# Patient Record
Sex: FEMALE | Race: WHITE | Employment: OTHER | URBAN - METROPOLITAN AREA
[De-identification: names, ages, dates, MRNs, and addresses within clinical notes are randomized per-mention and may not be internally consistent; named-entity substitution may affect disease eponyms.]

---

## 2017-02-26 ENCOUNTER — HOSPITAL ENCOUNTER (EMERGENCY)
Age: 57
Discharge: HOME | End: 2017-02-26
Attending: EMERGENCY MEDICINE
Payer: COMMERCIAL

## 2017-02-26 VITALS
HEIGHT: 67 IN | SYSTOLIC BLOOD PRESSURE: 122 MMHG | BODY MASS INDEX: 23.7 KG/M2 | HEART RATE: 58 BPM | TEMPERATURE: 97.5 F | WEIGHT: 151 LBS | RESPIRATION RATE: 16 BRPM | DIASTOLIC BLOOD PRESSURE: 70 MMHG | OXYGEN SATURATION: 100 %

## 2017-02-26 DIAGNOSIS — R10.31 ABDOMINAL PAIN, RIGHT LOWER QUADRANT: Primary | ICD-10-CM

## 2017-02-26 LAB
ALBUMIN SERPL BCP-MCNC: 3.5 G/DL (ref 3.5–5)
ALBUMIN/GLOB SERPL: 1 {RATIO} (ref 1.1–2.2)
ALP SERPL-CCNC: 64 U/L (ref 45–117)
ALT SERPL-CCNC: 16 U/L (ref 12–78)
ANION GAP BLD CALC-SCNC: 9 MMOL/L (ref 5–15)
APPEARANCE UR: CLEAR
AST SERPL W P-5'-P-CCNC: 8 U/L (ref 15–37)
BACTERIA URNS QL MICRO: NEGATIVE /HPF
BASOPHILS # BLD AUTO: 0 K/UL (ref 0–0.1)
BASOPHILS # BLD: 0 % (ref 0–1)
BILIRUB SERPL-MCNC: 0.4 MG/DL (ref 0.2–1)
BILIRUB UR QL: NEGATIVE
BUN SERPL-MCNC: 10 MG/DL (ref 6–20)
BUN/CREAT SERPL: 12 (ref 12–20)
CALCIUM SERPL-MCNC: 8.4 MG/DL (ref 8.5–10.1)
CHLORIDE SERPL-SCNC: 109 MMOL/L (ref 97–108)
CO2 SERPL-SCNC: 22 MMOL/L (ref 21–32)
COLOR UR: ABNORMAL
CREAT SERPL-MCNC: 0.84 MG/DL (ref 0.55–1.02)
EOSINOPHIL # BLD: 0.1 K/UL (ref 0–0.4)
EOSINOPHIL NFR BLD: 1 % (ref 0–7)
EPITH CASTS URNS QL MICRO: ABNORMAL /LPF
ERYTHROCYTE [DISTWIDTH] IN BLOOD BY AUTOMATED COUNT: 13.6 % (ref 11.5–14.5)
GLOBULIN SER CALC-MCNC: 3.4 G/DL (ref 2–4)
GLUCOSE SERPL-MCNC: 73 MG/DL (ref 65–100)
GLUCOSE UR STRIP.AUTO-MCNC: NEGATIVE MG/DL
HCT VFR BLD AUTO: 37.7 % (ref 35–47)
HGB BLD-MCNC: 12.6 G/DL (ref 11.5–16)
HGB UR QL STRIP: NEGATIVE
KETONES UR QL STRIP.AUTO: ABNORMAL MG/DL
LEUKOCYTE ESTERASE UR QL STRIP.AUTO: NEGATIVE
LYMPHOCYTES # BLD AUTO: 29 % (ref 12–49)
LYMPHOCYTES # BLD: 1.8 K/UL (ref 0.8–3.5)
MCH RBC QN AUTO: 29.9 PG (ref 26–34)
MCHC RBC AUTO-ENTMCNC: 33.4 G/DL (ref 30–36.5)
MCV RBC AUTO: 89.3 FL (ref 80–99)
MONOCYTES # BLD: 0.5 K/UL (ref 0–1)
MONOCYTES NFR BLD AUTO: 7 % (ref 5–13)
MUCOUS THREADS URNS QL MICRO: ABNORMAL /LPF
NEUTS SEG # BLD: 4 K/UL (ref 1.8–8)
NEUTS SEG NFR BLD AUTO: 63 % (ref 32–75)
NITRITE UR QL STRIP.AUTO: NEGATIVE
PH UR STRIP: 5.5 [PH] (ref 5–8)
PLATELET # BLD AUTO: 214 K/UL (ref 150–400)
POTASSIUM SERPL-SCNC: 4.1 MMOL/L (ref 3.5–5.1)
PROT SERPL-MCNC: 6.9 G/DL (ref 6.4–8.2)
PROT UR STRIP-MCNC: NEGATIVE MG/DL
RBC # BLD AUTO: 4.22 M/UL (ref 3.8–5.2)
RBC #/AREA URNS HPF: ABNORMAL /HPF (ref 0–5)
SODIUM SERPL-SCNC: 140 MMOL/L (ref 136–145)
SP GR UR REFRACTOMETRY: 1.02 (ref 1–1.03)
UROBILINOGEN UR QL STRIP.AUTO: 0.2 EU/DL (ref 0.2–1)
WBC # BLD AUTO: 6.4 K/UL (ref 3.6–11)
WBC URNS QL MICRO: ABNORMAL /HPF (ref 0–4)

## 2017-02-26 PROCEDURE — 99283 EMERGENCY DEPT VISIT LOW MDM: CPT

## 2017-02-26 PROCEDURE — 80053 COMPREHEN METABOLIC PANEL: CPT | Performed by: EMERGENCY MEDICINE

## 2017-02-26 PROCEDURE — 81001 URINALYSIS AUTO W/SCOPE: CPT | Performed by: NURSE PRACTITIONER

## 2017-02-26 PROCEDURE — 36415 COLL VENOUS BLD VENIPUNCTURE: CPT | Performed by: EMERGENCY MEDICINE

## 2017-02-26 PROCEDURE — 85025 COMPLETE CBC W/AUTO DIFF WBC: CPT | Performed by: EMERGENCY MEDICINE

## 2017-02-26 RX ORDER — MEGESTROL ACETATE 40 MG/1
TABLET ORAL
Refills: 1 | COMMUNITY
Start: 2017-02-10

## 2017-02-26 RX ORDER — MEDROXYPROGESTERONE ACETATE 10 MG/1
TABLET ORAL
Refills: 0 | COMMUNITY
Start: 2017-02-17

## 2017-02-26 RX ORDER — NITROFURANTOIN 25; 75 MG/1; MG/1
CAPSULE ORAL
Refills: 0 | COMMUNITY
Start: 2016-12-12 | End: 2017-02-26

## 2017-02-26 RX ORDER — HYDROCODONE BITARTRATE AND ACETAMINOPHEN 5; 300 MG/1; MG/1
TABLET ORAL
Refills: 0 | COMMUNITY
Start: 2017-01-23 | End: 2017-03-27 | Stop reason: ALTCHOICE

## 2017-02-26 RX ORDER — DICYCLOMINE HYDROCHLORIDE 10 MG/1
CAPSULE ORAL
Refills: 2 | COMMUNITY
Start: 2017-02-22 | End: 2017-03-27 | Stop reason: ALTCHOICE

## 2017-02-26 RX ORDER — SODIUM, POTASSIUM,MAG SULFATES 17.5-3.13G
SOLUTION, RECONSTITUTED, ORAL ORAL
Refills: 0 | COMMUNITY
Start: 2017-02-20 | End: 2017-02-26

## 2017-02-26 NOTE — ED PROVIDER NOTES
HPI Comments: 50 yo F with no previous medical hx presents ambulatory to the ED with persistent RLQ abdominal pain since December for which she has been evaluated multiple times. Pt states she has been evaluated in the ED, at [facility], with GYN and GI. She states she has had 2 US, CT scan, uterine bx as well as colonoscopy with bx of polyp. Pt states she was prescribed Diclycomine which she states has not improved her pain. Was advised to return for evaluation in 6 weeks with Dr. Gary Sandoval. The pt states her pain has not been improved by the Dicyclomine and has not been taking it. She has been taking hydrocodone prn. Pt denies nausea, vomiting, diarrhea, constipation, dysuria or hematuria. History reviewed. No pertinent past medical history. Social History    Marital status:              Spouse name:                       Years of education:                 Number of children:               Occupational History    None on file    Social History Main Topics    Smoking status: Never Smoker                                                                   Smokeless status: Not on file                       Alcohol use: Not on file     Drug use: Not on file     Sexual activity: Not on file          Other Topics            Concern    None on file    Social History Narrative    None on file          Patient is a 49 y.o. female presenting with abdominal pain. Abdominal Pain    Pertinent negatives include no fever, no diarrhea, no nausea, no vomiting, no constipation, no hematuria, no headaches, no chest pain and no back pain. History reviewed. No pertinent past medical history. Past Surgical History:   Procedure Laterality Date    HX APPENDECTOMY      HX PARTIAL THYROIDECTOMY           History reviewed. No pertinent family history.     Social History     Social History    Marital status:      Spouse name: N/A    Number of children: N/A    Years of education: N/A     Occupational History    Not on file. Social History Main Topics    Smoking status: Never Smoker    Smokeless tobacco: Not on file    Alcohol use Not on file    Drug use: Not on file    Sexual activity: Not on file     Other Topics Concern    Not on file     Social History Narrative         ALLERGIES: Review of patient's allergies indicates no known allergies. Review of Systems   Constitutional: Negative for activity change, appetite change, chills and fever. HENT: Negative for ear discharge and facial swelling. Eyes: Negative for discharge and redness. Respiratory: Negative for chest tightness, shortness of breath and wheezing. Cardiovascular: Negative for chest pain, palpitations and leg swelling. Gastrointestinal: Positive for abdominal pain. Negative for blood in stool, constipation, diarrhea, nausea, rectal pain and vomiting. Genitourinary: Negative for difficulty urinating, hematuria and urgency. Musculoskeletal: Negative for back pain, joint swelling, neck pain and neck stiffness. Skin: Negative for rash. Neurological: Negative for seizures, speech difficulty, weakness and headaches. Psychiatric/Behavioral: Negative for confusion. Vitals:    02/26/17 1046   BP: 117/69   Pulse: 67   Resp: 16   Temp: 98.3 °F (36.8 °C)   SpO2: 100%   Weight: 68.5 kg (151 lb)   Height: 5' 7\" (1.702 m)            Physical Exam   Constitutional: She is oriented to person, place, and time. She appears well-developed and well-nourished. No distress. HENT:   Head: Normocephalic and atraumatic. Eyes: Conjunctivae and EOM are normal. Pupils are equal, round, and reactive to light. Neck: Normal range of motion. Neck supple. Cardiovascular: Normal rate, regular rhythm, normal heart sounds and intact distal pulses. Pulmonary/Chest: Effort normal and breath sounds normal. No accessory muscle usage. No tachypnea. No respiratory distress. She has no decreased breath sounds. She has no wheezes.    B breath sounds CTA. No expiratory wheezing,crackles or rhonchi. No chest wall tenderness, tachycardia or tachypnea. No evidence of hypoxia. Abdominal: Soft. Bowel sounds are normal. She exhibits no distension and no mass. There is tenderness in the right lower quadrant. There is no rebound and no guarding. Abdomen soft, with tenderness to palpation of RLQ. (The appendix is absent) Active BS throughout. No flank or CVA tenderness. No rigidity, masses or guarding. Genitourinary:   Genitourinary Comments: No hematuria, dysuria. Musculoskeletal: Normal range of motion. Neurological: She is alert and oriented to person, place, and time. She has normal strength. She displays no atrophy. No cranial nerve deficit or sensory deficit. She exhibits normal muscle tone. Coordination and gait normal. GCS eye subscore is 4. GCS verbal subscore is 5. GCS motor subscore is 6. Skin: Skin is warm and dry. Psychiatric: She has a normal mood and affect. Her behavior is normal. Judgment and thought content normal.   Nursing note and vitals reviewed. MDM  Number of Diagnoses or Management Options  Abdominal pain, right lower quadrant:   Diagnosis management comments: 50 yo F with persistent RLQ pain. Denies nausea, vomiting, diarrhea, constipation. Denies hematuria or dysuria. Pt has been evaluated by GYN and GI. Has had CT, US x 2, bx, and colonoscopy with removal of polyp. Prescribed Dicyclomine which she states has not helped. Abdomen soft, with tenderness is RLQ. BS active throughout. Labs, UA ordered. Discussed hx, presentation and findings with Dr. Clayton Hill who agrees with plan of care and plan for FU with Dr. Nakia Eaton this week,return to the ED for worsening sx. Pt tolerating po fluids.   Pt has pain medications at home and will not be discharged with medications today       Amount and/or Complexity of Data Reviewed  Clinical lab tests: ordered and reviewed  Discuss the patient with other providers: yes ( Luis Hurtado    Patient Progress  Patient progress: stable    ED Course       Procedures

## 2017-02-26 NOTE — ED TRIAGE NOTES
Pt reports right lower quad abd pain for the past 2 months. Pt has seen her OBGYN and GI doctor ans was told they did not know what was wrong. Pt states the pain is worse and she had to take pain medication just to get to the ER.

## 2017-02-26 NOTE — DISCHARGE INSTRUCTIONS
Abdominal Pain: Care Instructions  Your Care Instructions    Abdominal pain has many possible causes. Some aren't serious and get better on their own in a few days. Others need more testing and treatment. If your pain continues or gets worse, you need to be rechecked and may need more tests to find out what is wrong. You may need surgery to correct the problem. Don't ignore new symptoms, such as fever, nausea and vomiting, urination problems, pain that gets worse, and dizziness. These may be signs of a more serious problem. Your doctor may have recommended a follow-up visit in the next 8 to 12 hours. If you are not getting better, you may need more tests or treatment. The doctor has checked you carefully, but problems can develop later. If you notice any problems or new symptoms, get medical treatment right away. Follow-up care is a key part of your treatment and safety. Be sure to make and go to all appointments, and call your doctor if you are having problems. It's also a good idea to know your test results and keep a list of the medicines you take. How can you care for yourself at home? · Rest until you feel better. · To prevent dehydration, drink plenty of fluids, enough so that your urine is light yellow or clear like water. Choose water and other caffeine-free clear liquids until you feel better. If you have kidney, heart, or liver disease and have to limit fluids, talk with your doctor before you increase the amount of fluids you drink. · If your stomach is upset, eat mild foods, such as rice, dry toast or crackers, bananas, and applesauce. Try eating several small meals instead of two or three large ones. · Wait until 48 hours after all symptoms have gone away before you have spicy foods, alcohol, and drinks that contain caffeine. · Do not eat foods that are high in fat. · Avoid anti-inflammatory medicines such as aspirin, ibuprofen (Advil, Motrin), and naproxen (Aleve).  These can cause stomach upset. Talk to your doctor if you take daily aspirin for another health problem. When should you call for help? Call 911 anytime you think you may need emergency care. For example, call if:  · You passed out (lost consciousness). · You pass maroon or very bloody stools. · You vomit blood or what looks like coffee grounds. · You have new, severe belly pain. Call your doctor now or seek immediate medical care if:  · Your pain gets worse, especially if it becomes focused in one area of your belly. · You have a new or higher fever. · Your stools are black and look like tar, or they have streaks of blood. · You have unexpected vaginal bleeding. · You have symptoms of a urinary tract infection. These may include:  ¨ Pain when you urinate. ¨ Urinating more often than usual.  ¨ Blood in your urine. · You are dizzy or lightheaded, or you feel like you may faint. Watch closely for changes in your health, and be sure to contact your doctor if:  · You are not getting better after 1 day (24 hours). Where can you learn more? Go to http://sallyFoodcloudluma.info/. Enter M239 in the search box to learn more about \"Abdominal Pain: Care Instructions. \"  Current as of: May 27, 2016  Content Version: 11.1  © 9253-1065 [employer]. Care instructions adapted under license by [employer] (which disclaims liability or warranty for this information). If you have questions about a medical condition or this instruction, always ask your healthcare professional. [place] [address] any warranty or liability for your use of this information. We hope that we have addressed all of your medical concerns. The examination and treatment you received in the Emergency Department were for an emergent problem and were not intended as complete care. It is important that you follow up with your healthcare provider(s) for ongoing care.  If your symptoms worsen or do not improve as expected, and you are unable to reach your usual health care provider(s), you should return to the Emergency Department. Today's healthcare is undergoing tremendous change, and patient satisfaction surveys are one of the many tools to assess the quality of medical care. You may receive a survey from the [employer] regarding your experience in the Emergency Department. I hope that your experience has been completely positive, particularly the medical care that I provided. As such, please participate in the survey; anything less than excellent does not meet my expectations or intentions. [facility] and [facility] participate in nationally recognized quality of care measures. If your blood pressure is greater than 120/80, as reported below, we urge that you seek medical care to address the potential of high blood pressure, commonly known as hypertension. Hypertension can be hereditary or can be caused by certain medical conditions, pain, stress, or \"white coat syndrome. \"       Please make an appointment with your health care provider(s) for follow up of your Emergency Department visit. VITALS:   Patient Vitals for the past 8 hrs:   Temp Pulse Resp BP SpO2   02/26/17 1046 98.3 °F (36.8 °C) 67 16 117/69 100 %          Thank you for allowing us to provide you with medical care today. We realize that you have many choices for your emergency care needs. Please choose us in the future for any continued health care needs. [facility]   Office: [phone]            Recent Results (from the past 24 hour(s))   CBC WITH AUTOMATED DIFF    Collection Time: 02/26/17 12:24 PM   Result Value Ref Range    WBC 6.4 3.6 - 11.0 K/uL    RBC 4.22 3.80 - 5.20 M/uL    HGB 12.6 11.5 - 16.0 g/dL    HCT 37.7 35.0 - 47.0 %    MCV 89.3 80.0 - 99.0 FL    MCH 29.9 26.0 - 34.0 PG    MCHC 33.4 30.0 - 36.5 g/dL    RDW 13.6 11.5 - 14.5 %    PLATELET 181 125 - 509 K/uL    NEUTROPHILS 63 32 - 75 %    LYMPHOCYTES 29 12 - 49 %    MONOCYTES 7 5 - 13 %    EOSINOPHILS 1 0 - 7 %    BASOPHILS 0 0 - 1 %    ABS. NEUTROPHILS 4.0 1.8 - 8.0 K/UL    ABS. LYMPHOCYTES 1.8 0.8 - 3.5 K/UL    ABS. MONOCYTES 0.5 0.0 - 1.0 K/UL    ABS. EOSINOPHILS 0.1 0.0 - 0.4 K/UL    ABS. BASOPHILS 0.0 0.0 - 0.1 K/UL   METABOLIC PANEL, COMPREHENSIVE    Collection Time: 02/26/17 12:24 PM   Result Value Ref Range    Sodium 140 136 - 145 mmol/L    Potassium 4.1 3.5 - 5.1 mmol/L    Chloride 109 (H) 97 - 108 mmol/L    CO2 22 21 - 32 mmol/L    Anion gap 9 5 - 15 mmol/L    Glucose 73 65 - 100 mg/dL    BUN 10 6 - 20 MG/DL    Creatinine 0.84 0.55 - 1.02 MG/DL    BUN/Creatinine ratio 12 12 - 20      GFR est AA >60 >60 ml/min/1.73m2    GFR est non-AA >60 >60 ml/min/1.73m2    Calcium 8.4 (L) 8.5 - 10.1 MG/DL    Bilirubin, total 0.4 0.2 - 1.0 MG/DL    ALT (SGPT) 16 12 - 78 U/L    AST (SGOT) 8 (L) 15 - 37 U/L    Alk. phosphatase 64 45 - 117 U/L    Protein, total 6.9 6.4 - 8.2 g/dL    Albumin 3.5 3.5 - 5.0 g/dL    Globulin 3.4 2.0 - 4.0 g/dL    A-G Ratio 1.0 (L) 1.1 - 2.2     URINALYSIS W/MICROSCOPIC    Collection Time: 02/26/17 12:24 PM   Result Value Ref Range    Color YELLOW/STRAW      Appearance CLEAR CLEAR      Specific gravity 1.024 1.003 - 1.030      pH (UA) 5.5 5.0 - 8.0      Protein NEGATIVE  NEG mg/dL    Glucose NEGATIVE  NEG mg/dL    Ketone TRACE (A) NEG mg/dL    Bilirubin NEGATIVE  NEG      Blood NEGATIVE  NEG      Urobilinogen 0.2 0.2 - 1.0 EU/dL    Nitrites NEGATIVE  NEG      Leukocyte Esterase NEGATIVE  NEG      WBC 0-4 0 - 4 /hpf    RBC 0-5 0 - 5 /hpf    Epithelial cells FEW FEW /lpf    Bacteria NEGATIVE  NEG /hpf    Mucus TRACE (A) NEG /lpf       No results found.

## 2017-02-26 NOTE — ED NOTES
Pt ambulatory out of ED with discharge instructions and prescriptions in hand given by Luzmaria Campbell NP; pt verbalized understanding of discharge paperwork and time allotted for questions. VSS. Pt alert and oriented.

## 2017-03-27 ENCOUNTER — OFFICE VISIT (OUTPATIENT)
Dept: BEHAVIORAL/MENTAL HEALTH CLINIC | Age: 57
End: 2017-03-27

## 2017-03-27 VITALS
BODY MASS INDEX: 23.02 KG/M2 | SYSTOLIC BLOOD PRESSURE: 109 MMHG | WEIGHT: 147 LBS | DIASTOLIC BLOOD PRESSURE: 59 MMHG | HEART RATE: 59 BPM

## 2017-03-27 DIAGNOSIS — F41.9 SEVERE ANXIETY: ICD-10-CM

## 2017-03-27 DIAGNOSIS — F33.2 SEVERE EPISODE OF RECURRENT MAJOR DEPRESSIVE DISORDER, WITHOUT PSYCHOTIC FEATURES (HCC): Primary | ICD-10-CM

## 2017-03-27 DIAGNOSIS — G47.00 INSOMNIA, UNSPECIFIED TYPE: ICD-10-CM

## 2017-03-27 RX ORDER — ESCITALOPRAM OXALATE 10 MG/1
TABLET ORAL
Refills: 1 | COMMUNITY
Start: 2017-03-13 | End: 2017-03-27 | Stop reason: DRUGHIGH

## 2017-03-27 RX ORDER — LORAZEPAM 0.5 MG/1
0.5 TABLET ORAL
Qty: 30 TAB | Refills: 0 | Status: SHIPPED | OUTPATIENT
Start: 2017-03-27 | End: 2017-04-26

## 2017-03-27 RX ORDER — ESCITALOPRAM OXALATE 10 MG/1
15 TABLET ORAL DAILY
Qty: 45 TAB | Refills: 0 | Status: SHIPPED | OUTPATIENT
Start: 2017-03-27 | End: 2017-04-24 | Stop reason: SDUPTHER

## 2017-03-27 NOTE — MR AVS SNAPSHOT
Visit Information Date & Time Provider Department Dept. Phone Encounter #  
 3/27/2017 10:00 AM Jagruti Rios MD [facility] [phone] [id number] Follow-up Instructions Return in about 1 month (around 4/27/2017). Your Appointments 4/24/2017  9:30 AM  
ESTABLISHED PATIENT with Jagruti Rios MD  
[facility]: New Patient f/u  
 [address] [facility] [address]  
[phone] [address] [facility] [address] Upcoming Health Maintenance Date Due Hepatitis C Screening 1960 DTaP/Tdap/Td series (1 - Tdap) 12/17/1983 PAP AKA CERVICAL CYTOLOGY 12/17/1983 BREAST CANCER SCRN MAMMOGRAM 12/17/2012 FOBT Q 1 YEAR AGE 50-75 12/17/2012 INFLUENZA AGE 9 TO ADULT 8/1/2016 Allergies as of 3/27/2017  Review Complete On: 3/27/2017 By: Jagruti Rios MD  
 No Known Allergies Current Immunizations  Never Reviewed No immunizations on file. Not reviewed this visit You Were Diagnosed With   
  
 Codes Comments Severe episode of recurrent major depressive disorder, without psychotic features ([facility] [address]    -  Primary ICD-10-CM: F33.2 ICD-9-CM: 296.33 Severe anxiety     ICD-10-CM: F41.9 ICD-9-CM: 300.00 Insomnia, unspecified type     ICD-10-CM: G47.00 ICD-9-CM: 780.52 Vitals BP Pulse Weight(growth percentile) LMP BMI OB Status 109/59 (!) 59 147 lb (66.7 kg) 03/20/2017 23.02 kg/m2 Having regular periods Smoking Status Never Smoker Vitals History BMI and BSA Data Body Mass Index Body Surface Area 23.02 kg/m 2 1.78 m 2 Preferred Pharmacy Pharmacy Name Phone [facility] [address] [phone] Your Updated Medication List  
  
   
This list is accurate as of: 3/27/17  5:26 PM.  Always use your most recent med list.  
  
  
 escitalopram oxalate 10 mg tablet Commonly known as:  Georgiann Bares Take 1.5 Tabs by mouth daily for 30 days. Indications: ANXIETY WITH DEPRESSION  
  
 levothyroxine 50 mcg tablet Commonly known as:  SYNTHROID Take 50 mcg by mouth Daily (before breakfast). LORazepam 0.5 mg tablet Commonly known as:  ATIVAN Take 1 Tab by mouth daily as needed for Anxiety for up to 30 days. Indications: ANXIETY  
  
 medroxyPROGESTERone 10 mg tablet Commonly known as:  PROVERA TAKE 1 TAB BY MOUTH DAILY FOR THE FIRST 10 DAYS OF THE MONTH. START TAKING IN APRIL. megestrol 40 mg tablet Commonly known as:  MEGACE  
TAKE 1 TABLET BY MOUTH DAILY  
  
 OMEGA 3 PO Take  by mouth daily. VITAMIN D3 PO Take  by mouth daily. Prescriptions Printed Refills  
 escitalopram oxalate (LEXAPRO) 10 mg tablet 0 Sig: Take 1.5 Tabs by mouth daily for 30 days. Indications: ANXIETY WITH DEPRESSION Class: Print Route: Oral  
 LORazepam (ATIVAN) 0.5 mg tablet 0 Sig: Take 1 Tab by mouth daily as needed for Anxiety for up to 30 days. Indications: ANXIETY Class: Print Route: Oral  
  
Follow-up Instructions Return in about 1 month (around 4/27/2017). Introducing [address]! Remberto Christinazina introduces [employer] patient portal. Now you can access parts of your medical record, email your doctor's office, and request medication refills online. 1. In your internet browser, go to [email or web address] 2. Click on the First Time User? Click Here link in the Sign In box. You will see the New Member Sign Up page. 3. Enter your [employer] Access Code exactly as it appears below. You will not need to use this code after youve completed the sign-up process. If you do not sign up before the expiration date, you must request a new code. · [employer] Access Code: [email or web address] Expires: 6/25/2017 10:21 AM 
 
 4. Enter the last four digits of your Social Security Number (xxxx) and Date of Birth (mm/dd/yyyy) as indicated and click Submit. You will be taken to the next sign-up page. 5. Create a [employer] ID. This will be your [employer] login ID and cannot be changed, so think of one that is secure and easy to remember. 6. Create a [employer] password. You can change your password at any time. 7. Enter your Password Reset Question and Answer. This can be used at a later time if you forget your password. 8. Enter your e-mail address. You will receive e-mail notification when new information is available in [address] 9. Click Sign Up. You can now view and download portions of your medical record. 10. Click the Download Summary menu link to download a portable copy of your medical information. If you have questions, please visit the Frequently Asked Questions section of the [employer] website. Remember, [employer] is NOT to be used for urgent needs. For medical emergencies, dial 911. Now available from your iPhone and Android! Please provide this summary of care documentation to your next provider. Your primary care clinician is listed as NONE. If you have any questions after today's visit, please call [phone].
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
## 2017-04-24 ENCOUNTER — OFFICE VISIT (OUTPATIENT)
Dept: BEHAVIORAL/MENTAL HEALTH CLINIC | Age: 57
End: 2017-04-24

## 2017-04-24 VITALS
BODY MASS INDEX: 23.23 KG/M2 | DIASTOLIC BLOOD PRESSURE: 70 MMHG | HEIGHT: 67 IN | WEIGHT: 148 LBS | SYSTOLIC BLOOD PRESSURE: 151 MMHG | HEART RATE: 73 BPM

## 2017-04-24 DIAGNOSIS — G47.00 INSOMNIA, UNSPECIFIED TYPE: ICD-10-CM

## 2017-04-24 DIAGNOSIS — F41.0 SEVERE ANXIETY WITH PANIC: ICD-10-CM

## 2017-04-24 DIAGNOSIS — F33.2 SEVERE EPISODE OF RECURRENT MAJOR DEPRESSIVE DISORDER, WITHOUT PSYCHOTIC FEATURES (HCC): Primary | ICD-10-CM

## 2017-04-24 RX ORDER — ESCITALOPRAM OXALATE 10 MG/1
15 TABLET ORAL DAILY
Qty: 45 TAB | Refills: 2 | Status: SHIPPED | OUTPATIENT
Start: 2017-04-24 | End: 2017-05-24

## 2017-04-24 NOTE — PROGRESS NOTES
Psychiatric Progress Note    Date: 4/24/2017  Account Number:  [de-identified]  Name: Bertrand Giordano    Length of psychotherapy session: 15 minutes     Total Patient Care Time Spent: 20 minutes : (Coordinated care:  counseling time with patient, individual psychotherapy with patient; discussions with family members and chart review). SUBJECTIVE:   Bertrand Giordano  is a 49 y.o.  female  patient presents for a therapy/psychopharmacological management appointment. Pt reports doing much better, anxiety and depression improved with increase in Lexapro dose, pt did not have to take Ativan for anxiety anymore. Pt is pleased with her progress. States that she is trying to wind up her affairs now in order to move back to [facility]. Her  already left a week ago to find job over there. Pt is asking for a letter from this office to take her dog with her in airplane as an emotional support dog. Will provide the letter. Patient denies SI/HI/SIB. No evidence of AH/VH or delusions.       Appetite:no change from normal   Sleep: good     Response to Treatment: Positive   Side Effects: none      Supportive/Cognitive/Reality-Oriented psychotherapy provided in regards to psychosocial stressors:   pre-admission and current problems   Housing issues   Occupational issues   Academic issues   Legal issues   Medical issues   Interpersonal conflicts   Stress of hospitalization  Psychoeducation provided  Treatment plan reviewed with patient-including diagnosis and medications  Worked on issues of denial & effects of substance dependency/use      OBJECTIVE:                 Mental Status exam: WNL except for      Sensorium  oriented to time, place and person   Relations cooperative    Eye Contact    appropriate   Appearance:  age appropriate, casually dressed and tall   Motor Behavior:  within normal limits   Speech:  normal pitch and normal volume   Thought Process: goal directed and logical   Thought Content free of delusions and free of hallucinations   Suicidal ideations none   Homicidal ideations none   Mood:  Improved    Affect:  mood-congruent   Memory recent  adequate   Memory remote:  adequate   Concentration:  adequate   Abstraction:  abstract   Insight:  good   Reliability good   Judgment:  good       No Known Allergies     Current Outpatient Prescriptions   Medication Sig Dispense Refill    escitalopram oxalate (LEXAPRO) 10 mg tablet Take 1.5 Tabs by mouth daily for 30 days. Indications: ANXIETY WITH DEPRESSION 45 Tab 2    medroxyPROGESTERone (PROVERA) 10 mg tablet TAKE 1 TAB BY MOUTH DAILY FOR THE FIRST 10 DAYS OF THE MONTH. START TAKING IN APRIL.  0    megestrol (MEGACE) 40 mg tablet TAKE 1 TABLET BY MOUTH DAILY  1    FLAXSEED OIL (OMEGA 3 PO) Take  by mouth daily.  CHOLECALCIFEROL, VITAMIN D3, (VITAMIN D3 PO) Take  by mouth daily.  levothyroxine (SYNTHROID) 50 mcg tablet Take 50 mcg by mouth Daily (before breakfast).  LORazepam (ATIVAN) 0.5 mg tablet Take 1 Tab by mouth daily as needed for Anxiety for up to 30 days. Indications: ANXIETY 30 Tab 0        Medication Changes/Adjustments:   Medications Discontinued During This Encounter   Medication Reason    escitalopram oxalate (LEXAPRO) 10 mg tablet Reorder          ASSESSMENT:  Eris Elizondo  is a 49 y.o.  female patient presented for her f/u appointment, responding well to the medicine. Diagnoses:   Axis I: Major Depressive disorder, recurrent, severe, without psychotic symptoms  Severe anxiety with panic attacks  Insomnia   Axis II: Deferred  Axis III: None reported   Axis IV: Mild to moderate   Axis V:  60-70    RECOMMENDATIONS/PLAN:   1. Medications: Medications reviewed, continue with the current meds  Orders Placed This Encounter    escitalopram oxalate (LEXAPRO) 10 mg tablet      2. Follow-up Disposition:  Return in about 2 months (around 6/24/2017).

## 2017-04-24 NOTE — MR AVS SNAPSHOT
Visit Information Date & Time Provider Department Dept. Phone Encounter #  
 4/24/2017  9:30 AM Dakota Murray MD [facility] [phone] [id number] Follow-up Instructions Return in about 2 months (around 6/24/2017). Upcoming Health Maintenance Date Due Hepatitis C Screening 1960 DTaP/Tdap/Td series (1 - Tdap) 12/17/1983 PAP AKA CERVICAL CYTOLOGY 12/17/1983 BREAST CANCER SCRN MAMMOGRAM 12/17/2012 FOBT Q 1 YEAR AGE 50-75 12/17/2012 INFLUENZA AGE 9 TO ADULT 8/1/2016 Allergies as of 4/24/2017  Review Complete On: 4/24/2017 By: Dakota Murray MD  
 No Known Allergies Current Immunizations  Never Reviewed No immunizations on file. Not reviewed this visit You Were Diagnosed With   
  
 Codes Comments Severe episode of recurrent major depressive disorder, without psychotic features ([facility]    -  Primary ICD-10-CM: F33.2 ICD-9-CM: 296.33 Severe anxiety with panic     ICD-10-CM: F41.0 ICD-9-CM: 300.01 Insomnia, unspecified type     ICD-10-CM: G47.00 ICD-9-CM: 780.52 Vitals BP Pulse Height(growth percentile) Weight(growth percentile) LMP BMI  
 151/70 73 5' 7\" (1.702 m) 148 lb (67.1 kg) 03/20/2017 23.18 kg/m2 OB Status Smoking Status Having regular periods Never Smoker Vitals History BMI and BSA Data Body Mass Index Body Surface Area  
 23.18 kg/m 2 1.78 m 2 Preferred Pharmacy Pharmacy Name Phone [facility] [address] [phone] Your Updated Medication List  
  
   
This list is accurate as of: 4/24/17  9:55 AM.  Always use your most recent med list.  
  
  
  
  
 escitalopram oxalate 10 mg tablet Commonly known as:  Bhanu Mealing Take 1.5 Tabs by mouth daily for 30 days. Indications: ANXIETY WITH DEPRESSION  
  
 levothyroxine 50 mcg tablet Commonly known as:  SYNTHROID  
 Take 50 mcg by mouth Daily (before breakfast). LORazepam 0.5 mg tablet Commonly known as:  ATIVAN Take 1 Tab by mouth daily as needed for Anxiety for up to 30 days. Indications: ANXIETY  
  
 medroxyPROGESTERone 10 mg tablet Commonly known as:  PROVERA TAKE 1 TAB BY MOUTH DAILY FOR THE FIRST 10 DAYS OF THE MONTH. START TAKING IN APRIL. megestrol 40 mg tablet Commonly known as:  MEGACE  
TAKE 1 TABLET BY MOUTH DAILY  
  
 OMEGA 3 PO Take  by mouth daily. VITAMIN D3 PO Take  by mouth daily. Prescriptions Sent to Pharmacy Refills  
 escitalopram oxalate (LEXAPRO) 10 mg tablet 2 Sig: Take 1.5 Tabs by mouth daily for 30 days. Indications: ANXIETY WITH DEPRESSION Class: Normal  
 Pharmacy: [facility] [id number] [place] [facility] [address], [address]  #: [phone] Route: Oral  
  
Follow-up Instructions Return in about 2 months (around 6/24/2017). Introducing [facility] & HEALTH SERVICES! Kandis Ramesh introduces [employer]. Z patient portal. Now you can access parts of your medical record, email your doctor's office, and request medication refills online. 1. In your internet browser, go to [email or web address] 2. Click on the First Time User? Click Here link in the Sign In box. You will see the New Member Sign Up page. 3. Enter your Dr. Z Access Code exactly as it appears below. You will not need to use this code after youve completed the sign-up process. If you do not sign up before the expiration date, you must request a new code. · Dr. Z Access Code: [email or web address] Expires: 6/25/2017 10:21 AM 
 
4. Enter the last four digits of your Social Security Number (xxxx) and Date of Birth (mm/dd/yyyy) as indicated and click Submit. You will be taken to the next sign-up page. 5. Create a Decoholict ID. This will be your Decoholict login ID and cannot be changed, so think of one that is secure and easy to remember. 6. Create a Mogi password. You can change your password at any time. 7. Enter your Password Reset Question and Answer. This can be used at a later time if you forget your password. 8. Enter your e-mail address. You will receive e-mail notification when new information is available in [address] 9. Click Sign Up. You can now view and download portions of your medical record. 10. Click the Download Summary menu link to download a portable copy of your medical information. If you have questions, please visit the Frequently Asked Questions section of the Mogi website. Remember, Mogi is NOT to be used for urgent needs. For medical emergencies, dial 911. Now available from your iPhone and Android! Please provide this summary of care documentation to your next provider. Your primary care clinician is listed as NONE. If you have any questions after today's visit, please call [phone].

## 2017-06-26 ENCOUNTER — OFFICE VISIT (OUTPATIENT)
Dept: BEHAVIORAL/MENTAL HEALTH CLINIC | Age: 57
End: 2017-06-26

## 2017-06-26 VITALS
SYSTOLIC BLOOD PRESSURE: 113 MMHG | HEART RATE: 71 BPM | HEIGHT: 67 IN | WEIGHT: 145 LBS | DIASTOLIC BLOOD PRESSURE: 61 MMHG | BODY MASS INDEX: 22.76 KG/M2

## 2017-06-26 DIAGNOSIS — F33.2 SEVERE EPISODE OF RECURRENT MAJOR DEPRESSIVE DISORDER, WITHOUT PSYCHOTIC FEATURES (HCC): Primary | ICD-10-CM

## 2017-06-26 DIAGNOSIS — F41.0 SEVERE ANXIETY WITH PANIC: ICD-10-CM

## 2017-06-26 DIAGNOSIS — G47.00 INSOMNIA, UNSPECIFIED TYPE: ICD-10-CM

## 2017-06-26 RX ORDER — ESCITALOPRAM OXALATE 20 MG/1
20 TABLET ORAL DAILY
Qty: 30 TAB | Refills: 0 | Status: SHIPPED | OUTPATIENT
Start: 2017-06-26 | End: 2017-07-21 | Stop reason: SDUPTHER

## 2017-06-26 RX ORDER — LORAZEPAM 0.5 MG/1
0.5 TABLET ORAL
Qty: 60 TAB | Refills: 0 | Status: SHIPPED | OUTPATIENT
Start: 2017-06-26 | End: 2017-07-21 | Stop reason: SDUPTHER

## 2017-06-26 RX ORDER — ESCITALOPRAM OXALATE 10 MG/1
TABLET ORAL
Refills: 2 | COMMUNITY
Start: 2017-06-18 | End: 2017-06-26 | Stop reason: DRUGHIGH

## 2017-06-26 NOTE — MR AVS SNAPSHOT
Visit Information Date & Time Provider Department Dept. Phone Encounter #  
 6/26/2017  1:45 PM Coral Gar MD [address] [facility] [phone] [id number] Follow-up Instructions Return in about 1 week (around 7/3/2017). Your Appointments 7/10/2017  3:30 PM  
ESTABLISHED PATIENT with Coral Gar MD  
[facility] [address] [facility]) Appt Note:   
 [address] [facility] [address] [place] [address]  
[phone] [address] Upcoming Health Maintenance Date Due Hepatitis C Screening 1960 DTaP/Tdap/Td series (1 - Tdap) 12/17/1983 PAP AKA CERVICAL CYTOLOGY 12/17/1983 BREAST CANCER SCRN MAMMOGRAM 12/17/2012 FOBT Q 1 YEAR AGE 50-75 12/17/2012 INFLUENZA AGE 9 TO ADULT 8/1/2017 Allergies as of 6/26/2017  Review Complete On: 6/26/2017 By: Coral Gar MD  
 No Known Allergies Current Immunizations  Never Reviewed No immunizations on file. Not reviewed this visit You Were Diagnosed With   
  
 Codes Comments Severe episode of recurrent major depressive disorder, without psychotic features ([facility]    -  Primary ICD-10-CM: F33.2 ICD-9-CM: 296.33 Severe anxiety with panic     ICD-10-CM: F41.0 ICD-9-CM: 300.01 Insomnia, unspecified type     ICD-10-CM: G47.00 ICD-9-CM: 780.52 Vitals BP Pulse Height(growth percentile) Weight(growth percentile) LMP BMI  
 113/61 71 5' 7\" (1.702 m) 145 lb (65.8 kg) 03/20/2017 22.71 kg/m2 OB Status Smoking Status Postmenopausal Never Smoker Vitals History BMI and BSA Data Body Mass Index Body Surface Area  
 22.71 kg/m 2 1.76 m 2 Preferred Pharmacy [facility]acy [facility] Phone [facility] [address] [phone] Your Updated Medication List  
  
   
This list is accurate as of: 6/26/17  2:12 PM.  Always use your most recent med list.  
  
  
  
  
 escitalopram oxalate 20 mg tablet Commonly known as:  Emir Hernandez Take 1 Tab by mouth daily for 30 days. Indications: ANXIETY WITH DEPRESSION  
  
 levothyroxine 50 mcg tablet Commonly known as:  SYNTHROID Take 50 mcg by mouth Daily (before breakfast). LORazepam 0.5 mg tablet Commonly known as:  ATIVAN Take 1 Tab by mouth two (2) times daily as needed for Anxiety for up to 30 days. Max Daily Amount: 1 mg. Indications: anxiety  
  
 medroxyPROGESTERone 10 mg tablet Commonly known as:  PROVERA TAKE 1 TAB BY MOUTH DAILY FOR THE FIRST 10 DAYS OF THE MONTH. START TAKING IN APRIL. megestrol 40 mg tablet Commonly known as:  MEGACE  
TAKE 1 TABLET BY MOUTH DAILY  
  
 OMEGA 3 PO Take  by mouth daily. VITAMIN B-12 PO Take  by mouth. VITAMIN D3 PO Take  by mouth daily. Prescriptions Printed Refills LORazepam (ATIVAN) 0.5 mg tablet 0 Sig: Take 1 Tab by mouth two (2) times daily as needed for Anxiety for up to 30 days. Max Daily Amount: 1 mg. Indications: anxiety Class: Print Route: Oral  
 escitalopram oxalate (LEXAPRO) 20 mg tablet 0 Sig: Take 1 Tab by mouth daily for 30 days. Indications: ANXIETY WITH DEPRESSION Class: Print Route: Oral  
  
Follow-up Instructions Return in about 1 week (around 7/3/2017). Introducing [facility] & HEALTH SERVICES! Mecca De Anda introduces [employer] patient portal. Now you can access parts of your medical record, email your doctor's office, and request medication refills online. 1. In your internet browser, go to [email or web address] 2. Click on the First Time User? Click Here link in the Sign In box. You will see the New Member Sign Up page. 3. Enter your [employer] Access Code exactly as it appears below. You will not need to use this code after youve completed the sign-up process.  If you do not sign up before the expiration date, you must request a new code. 
 
· [employer] Access Code: [email or web address] Expires: 9/24/2017  2:03 PM 
 
4. Enter the last four digits of your Social Security Number (xxxx) and Date of Birth (mm/dd/yyyy) as indicated and click Submit. You will be taken to the next sign-up page. 5. Create a Velasca ID. This will be your [employer] login ID and cannot be changed, so think of one that is secure and easy to remember. 6. Create a Vel[employer]a password. You can change your password at any time. 7. Enter your Password Reset Question and Answer. This can be used at a later time if you forget your password. 8. Enter your e-mail address. You will receive e-mail notification when new information is available in [address] 9. Click Sign Up. You can now view and download portions of your medical record. 10. Click the Download Summary menu link to download a portable copy of your medical information. If you have questions, please visit the Frequently Asked Questions section of the [employer] website. Remember, [employer] is NOT to be used for urgent needs. For medical emergencies, dial 911. Now available from your iPhone and Android! Please provide this summary of care documentation to your next provider. Your primary care clinician is listed as NONE. If you have any questions after today's visit, please call [phone].

## 2017-06-29 NOTE — PROGRESS NOTES
Psychiatric Progress Note    Date: 6/26/2017  Account Number:  [de-identified]  Name: Babak Branch    Length of psychotherapy session: 20 minutes     Total Patient Care Time Spent: 25 minutes : (Coordinated care:  counseling time with patient, individual psychotherapy with patient; discussions with family members and chart review). SUBJECTIVE:   Babak Branch  is a 49 y.o.  female  patient presents for a therapy/psychopharmacological management appointment. Pt presented distraught, states she lost her dog two weeks ago, whom she had planned to take along to [facility], has been feeling increasing depressed since then. Has no motivation, no desire, been secluded, having anxiety and panic attacks, has had passive suicidal ideation, no active thoughts or plans. Feels clueless, contacted her  in [facility] to come back to [facility] which he refused, he wants her to wind up and move back to [facility]. Assessed safety, recommended inpatient admission if symptoms worsen. Provided support, pt has supportive friends who visit her and are able to take care of her. Pt reports that her friend is going to pick the Ashes of her dog today as she can't do that, too emotional. Encouraged pt to seek grief counseling, recommended psychotherapy with Kera. Pt agreed. Patient denies SI/HI/SIB. No evidence of AH/VH or delusions.       Appetite:no change from normal   Sleep: not good     Response to Treatment: Positive   Side Effects: none      Supportive/Cognitive/Reality-Oriented psychotherapy provided in regards to psychosocial stressors:   pre-admission and current problems   Housing issues   Occupational issues   Academic issues   Legal issues   Medical issues   Interpersonal conflicts   Stress of hospitalization  Psychoeducation provided  Treatment plan reviewed with patient-including diagnosis and medications  Worked on issues of denial & effects of substance dependency/use      OBJECTIVE:                 Mental Status exam: WNL except for      Sensorium  oriented to time, place and person   Relations cooperative    Eye Contact    appropriate   Appearance:  age appropriate, casually dressed and tall and tearful   Motor Behavior:  within normal limits   Speech:  normal pitch and normal volume   Thought Process: goal directed and logical   Thought Content free of delusions and free of hallucinations   Suicidal ideations none   Homicidal ideations none   Mood:  Depressed and anxious   Affect:  mood-congruent   Memory recent  adequate   Memory remote:  adequate   Concentration:  adequate   Abstraction:  abstract   Insight:  good   Reliability good   Judgment:  good       No Known Allergies     Current Outpatient Prescriptions   Medication Sig Dispense Refill    CYANOCOBALAMIN, VITAMIN B-12, (VITAMIN B-12 PO) Take  by mouth.  LORazepam (ATIVAN) 0.5 mg tablet Take 1 Tab by mouth two (2) times daily as needed for Anxiety for up to 30 days. Max Daily Amount: 1 mg. Indications: anxiety 60 Tab 0    escitalopram oxalate (LEXAPRO) 20 mg tablet Take 1 Tab by mouth daily for 30 days. Indications: ANXIETY WITH DEPRESSION 30 Tab 0    FLAXSEED OIL (OMEGA 3 PO) Take  by mouth daily.  CHOLECALCIFEROL, VITAMIN D3, (VITAMIN D3 PO) Take  by mouth daily.  levothyroxine (SYNTHROID) 50 mcg tablet Take 50 mcg by mouth Daily (before breakfast).  medroxyPROGESTERone (PROVERA) 10 mg tablet TAKE 1 TAB BY MOUTH DAILY FOR THE FIRST 10 DAYS OF THE MONTH.  START TAKING IN APRIL.  0    megestrol (MEGACE) 40 mg tablet TAKE 1 TABLET BY MOUTH DAILY  1        Medication Changes/Adjustments:   Medications Discontinued During This Encounter   Medication Reason    escitalopram oxalate (LEXAPRO) 10 mg tablet Dose Adjustment          ASSESSMENT:  Chiara Black  is a 49 y.o.  female patient presented for her f/u appointment, pt is very depressed and anxious, lost her dog whom she had planned to take along to [place] with her, now finds no motivation, has thoughts of dog all the time. Kept the dog for a day at home after his death, friends helped out. Provided support, assessed safety, offered inpatient admission which pt refused, she agreed to work on coping skills and agreed to stay connected with her friends and work on [address] down her affairs so she can move back to [facility] to live with her . Pt will be seen in a week, advised pt to come to ED if her symptoms worsen, pt agreed. Diagnoses:   Axis I: Major Depressive disorder, recurrent, severe, without psychotic symptoms  Severe anxiety with panic attacks  Insomnia   Axis II: Deferred  Axis III: None reported   Axis IV: severe   Axis V:  50-55    RECOMMENDATIONS/PLAN:   1. Medications: Medications reviewed, discussed medication adjustment, pt did not feel it necessary, will rather depend on her support system to help her in down times, will continue with the current meds. Orders Placed This Encounter    DISCONTD: escitalopram oxalate (LEXAPRO) 10 mg tablet    CYANOCOBALAMIN, VITAMIN B-12, (VITAMIN B-12 PO)    LORazepam (ATIVAN) 0.5 mg tablet    escitalopram oxalate (LEXAPRO) 20 mg tablet      2. Follow-up Disposition:  Return in about 1 week (around 7/3/2017).

## 2017-07-05 ENCOUNTER — OFFICE VISIT (OUTPATIENT)
Dept: BEHAVIORAL/MENTAL HEALTH CLINIC | Age: 57
End: 2017-07-05

## 2017-07-05 VITALS
HEIGHT: 67 IN | HEART RATE: 65 BPM | DIASTOLIC BLOOD PRESSURE: 70 MMHG | WEIGHT: 147 LBS | BODY MASS INDEX: 23.07 KG/M2 | SYSTOLIC BLOOD PRESSURE: 117 MMHG

## 2017-07-05 DIAGNOSIS — F41.0 SEVERE ANXIETY WITH PANIC: ICD-10-CM

## 2017-07-05 DIAGNOSIS — G47.00 INSOMNIA, UNSPECIFIED TYPE: ICD-10-CM

## 2017-07-05 DIAGNOSIS — F33.2 SEVERE EPISODE OF RECURRENT MAJOR DEPRESSIVE DISORDER, WITHOUT PSYCHOTIC FEATURES (HCC): Primary | ICD-10-CM

## 2017-07-05 NOTE — MR AVS SNAPSHOT
Visit Information Date & Time Provider Department Dept. Phone Encounter #  
 7/5/2017  8:45 AM Jared [place] [address] [facility] [phone] [id number] Follow-up Instructions Return in about 2 weeks (around 7/19/2017). Your Appointments 7/7/2017  1:30 PM  
COUNSELING with DIONICIO Pandey [address] ([address] [facility]) Appt Note: Referred by Dr Jeff Pedro [address] [facility] 13  
[phone] [address] [place] [address] Upcoming Health Maintenance Date Due Hepatitis C Screening 1960 DTaP/Tdap/Td series (1 - Tdap) 12/17/1983 PAP AKA CERVICAL CYTOLOGY 12/17/1983 BREAST CANCER SCRN MAMMOGRAM 12/17/2012 FOBT Q 1 YEAR AGE 50-75 12/17/2012 INFLUENZA AGE 9 TO ADULT 8/1/2017 Allergies as of 7/5/2017  Review Complete On: 7/5/2017 By: Jared River MD  
 No Known Allergies Current Immunizations  Never Reviewed No immunizations on file. Not reviewed this visit You Were Diagnosed With   
  
 Codes Comments Severe episode of recurrent major depressive disorder, without psychotic features ([facility]    -  Primary ICD-10-CM: F33.2 ICD-9-CM: 296.33 Severe anxiety with panic     ICD-10-CM: F41.0 ICD-9-CM: 300.01 Insomnia, unspecified type     ICD-10-CM: G47.00 ICD-9-CM: 780.52 Vitals BP Pulse Height(growth percentile) Weight(growth percentile) LMP BMI  
 117/70 65 5' 7\" (1.702 m) 147 lb (66.7 kg) 03/20/2017 23.02 kg/m2 OB Status Smoking Status Postmenopausal Never Smoker BMI and BSA Data Body Mass Index Body Surface Area 23.02 kg/m 2 1.78 m 2 Preferred Pharmacy Pharmacy Name Phone [facility] [address] [phone] Your Updated Medication List  
  
   
This list is accurate as of: 7/5/17  9:36 AM.  Always use your most recent med list.  
  
  
  
  
 escitalopram oxalate 20 mg tablet Commonly known as:  Napolean Mantle Take 1 Tab by mouth daily for 30 days. Indications: ANXIETY WITH DEPRESSION  
  
 levothyroxine 50 mcg tablet Commonly known as:  SYNTHROID Take 50 mcg by mouth Daily (before breakfast). LORazepam 0.5 mg tablet Commonly known as:  ATIVAN Take 1 Tab by mouth two (2) times daily as needed for Anxiety for up to 30 days. Max Daily Amount: 1 mg. Indications: anxiety  
  
 medroxyPROGESTERone 10 mg tablet Commonly known as:  PROVERA TAKE 1 TAB BY MOUTH DAILY FOR THE FIRST 10 DAYS OF THE MONTH. START TAKING IN APRIL. megestrol 40 mg tablet Commonly known as:  MEGACE  
TAKE 1 TABLET BY MOUTH DAILY  
  
 OMEGA 3 PO Take  by mouth daily. VITAMIN B-12 PO Take  by mouth. VITAMIN D3 PO Take  by mouth daily. Follow-up Instructions Return in about 2 weeks (around 7/19/2017). Introducing [facility] HE[facility]! Jorge Green introduces IQcard patient portal. Now you can access parts of your medical record, email your doctor's office, and request medication refills online. 1. In your internet browser, go to [email or web address] 2. Click on the First Time User? Click Here link in the Sign In box. You will see the New Member Sign Up page. 3. Enter your IQcard Access Code exactly as it appears below. You will not need to use this code after youve completed the sign-up process. If you do not sign up before the expiration date, you must request a new code. · IQcard Access Code: [email or web address] Expires: 9/24/2017  2:03 PM 
 
4. Enter the last four digits of your Social Security Number (xxxx) and Date of Birth (mm/dd/yyyy) as indicated and click Submit. You will be taken to the next sign-up page. 5. Create a IQcard ID. This will be your IQcard login ID and cannot be changed, so think of one that is secure and easy to remember. 6. Create a Neighborhoods password. You can change your password at any time. 7. Enter your Password Reset Question and Answer. This can be used at a later time if you forget your password. 8. Enter your e-mail address. You will receive e-mail notification when new information is available in [address] 9. Click Sign Up. You can now view and download portions of your medical record. 10. Click the Download Summary menu link to download a portable copy of your medical information. If you have questions, please visit the Frequently Asked Questions section of the Neighborhoods website. Remember, Neighborhoods is NOT to be used for urgent needs. For medical emergencies, dial 911. Now available from your iPhone and Android! Please provide this summary of care documentation to your next provider. Your primary care clinician is listed as NONE. If you have any questions after today's visit, please call [phone].

## 2017-07-06 NOTE — PROGRESS NOTES
Psychiatric Progress Note    Date: 7/5/2017  Account Number:  [de-identified]  Name: Israel Yu    Length of psychotherapy session: 15 minutes     Total Patient Care Time Spent: 20 minutes : (Coordinated care:  counseling time with patient, individual psychotherapy with patient; discussions with family members and chart review). SUBJECTIVE:   Israel Yu  is a 49 y.o.  female  patient presents for a therapy/psychopharmacological management appointment. Pt reported doing a little better, still very emotional and tearful for losing her dog, states she can't stay in the house as the house reminds her of her dog, she wishes that he could come back but does realize that its not possible. 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 She is not sure if her  will be able to come and help her out with packing and transportation of stuff back to [facility]. Pt does have supportive friends who help her out. Pt is starting psychotherapy and will have her first session with Kera on Friday. Patient denies SI/HI/SIB. No evidence of AH/VH or delusions.       Appetite:no change from normal   Sleep: not good     Response to Treatment: Positive   Side Effects: none      Supportive/Cognitive/Reality-Oriented psychotherapy provided in regards to psychosocial stressors:   pre-admission and current problems   Housing issues   Occupational issues   Academic issues   Legal issues   Medical issues   Interpersonal conflicts   Stress of hospitalization  Psychoeducation provided  Treatment plan reviewed with patient-including diagnosis and medications  Worked on issues of denial & effects of substance dependency/use      OBJECTIVE:                 Mental Status exam: WNL except for      Sensorium  oriented to time, place and person   Relations cooperative    Eye Contact    appropriate   Appearance:  age appropriate, casually dressed and tall and tearful   Motor Behavior:  within normal limits   Speech:  normal pitch and normal volume   Thought Process: goal directed and logical   Thought Content free of delusions and free of hallucinations   Suicidal ideations none   Homicidal ideations none   Mood:  Depressed and anxious   Affect:  mood-congruent   Memory recent  adequate   Memory remote:  adequate   Concentration:  adequate   Abstraction:  abstract   Insight:  good   Reliability good   Judgment:  good       No Known Allergies     Current Outpatient Prescriptions   Medication Sig Dispense Refill    CYANOCOBALAMIN, VITAMIN B-12, (VITAMIN B-12 PO) Take  by mouth.  LORazepam (ATIVAN) 0.5 mg tablet Take 1 Tab by mouth two (2) times daily as needed for Anxiety for up to 30 days. Max Daily Amount: 1 mg. Indications: anxiety 60 Tab 0    escitalopram oxalate (LEXAPRO) 20 mg tablet Take 1 Tab by mouth daily for 30 days. Indications: ANXIETY WITH DEPRESSION 30 Tab 0    medroxyPROGESTERone (PROVERA) 10 mg tablet TAKE 1 TAB BY MOUTH DAILY FOR THE FIRST 10 DAYS OF THE MONTH. START TAKING IN APRIL.  0    megestrol (MEGACE) 40 mg tablet TAKE 1 TABLET BY MOUTH DAILY  1    FLAXSEED OIL (OMEGA 3 PO) Take  by mouth daily.  CHOLECALCIFEROL, VITAMIN D3, (VITAMIN D3 PO) Take  by mouth daily.  levothyroxine (SYNTHROID) 50 mcg tablet Take 50 mcg by mouth Daily (before breakfast). Medication Changes/Adjustments: There are no discontinued medications. ASSESSMENT:  Garcia Julien  is a 49 y.o.  female patient presented for her f/u appointment, doing a little better, still preoccupied with the death of her dog, unable to stay at home due to his memories, friends are helping out and keeping her engaged and involved with other activities.      Diagnoses:   Axis I: Major Depressive disorder, recurrent, severe, without psychotic symptoms  Severe anxiety with panic attacks  Insomnia   Axis II: Deferred  Axis III: None reported   Axis IV: severe   Axis V:  55-59    RECOMMENDATIONS/PLAN:   1. Medications: Medications reviewed, continue with the current meds, pt to attend psychotherapy. No orders of the defined types were placed in this encounter. 2.  Follow-up Disposition:  Return in about 2 weeks (around 7/19/2017).

## 2017-07-07 ENCOUNTER — OFFICE VISIT (OUTPATIENT)
Dept: BEHAVIORAL/MENTAL HEALTH CLINIC | Age: 57
End: 2017-07-07

## 2017-07-07 DIAGNOSIS — F32.2 SEVERE MAJOR DEPRESSION WITHOUT PSYCHOTIC FEATURES (HCC): ICD-10-CM

## 2017-07-07 DIAGNOSIS — F43.81 GRIEF REACTION WITH PROLONGED BEREAVEMENT: ICD-10-CM

## 2017-07-07 DIAGNOSIS — F41.0 PANIC ANXIETY SYNDROME: Primary | ICD-10-CM

## 2017-07-10 ENCOUNTER — OFFICE VISIT (OUTPATIENT)
Dept: BEHAVIORAL/MENTAL HEALTH CLINIC | Age: 57
End: 2017-07-10

## 2017-07-10 DIAGNOSIS — F41.0 PANIC ANXIETY SYNDROME: ICD-10-CM

## 2017-07-10 DIAGNOSIS — F43.81 GRIEF REACTION WITH PROLONGED BEREAVEMENT: ICD-10-CM

## 2017-07-10 DIAGNOSIS — F32.2 SEVERE MAJOR DEPRESSION WITHOUT PSYCHOTIC FEATURES (HCC): Primary | ICD-10-CM

## 2017-07-10 PROBLEM — F33.3 MAJOR DEPRESSIVE DISORDER, RECURRENT EPISODE, SEVERE, WITH PSYCHOTIC BEHAVIOR (HCC): Status: ACTIVE | Noted: 2017-07-10
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
## 2017-07-12 PROBLEM — F32.2 SEVERE MAJOR DEPRESSION WITHOUT PSYCHOTIC FEATURES (HCC): Status: ACTIVE | Noted: 2017-07-12
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
## 2017-07-19 ENCOUNTER — OFFICE VISIT (OUTPATIENT)
Dept: BEHAVIORAL/MENTAL HEALTH CLINIC | Age: 57
End: 2017-07-19

## 2017-07-19 DIAGNOSIS — F32.2 SEVERE MAJOR DEPRESSION WITHOUT PSYCHOTIC FEATURES (HCC): Primary | ICD-10-CM

## 2017-07-19 DIAGNOSIS — F43.81 GRIEF REACTION WITH PROLONGED BEREAVEMENT: ICD-10-CM
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
## 2017-07-21 ENCOUNTER — OFFICE VISIT (OUTPATIENT)
Dept: BEHAVIORAL/MENTAL HEALTH CLINIC | Age: 57
End: 2017-07-21

## 2017-07-21 VITALS
WEIGHT: 142 LBS | DIASTOLIC BLOOD PRESSURE: 66 MMHG | BODY MASS INDEX: 22.29 KG/M2 | SYSTOLIC BLOOD PRESSURE: 118 MMHG | HEIGHT: 67 IN | HEART RATE: 63 BPM

## 2017-07-21 DIAGNOSIS — F33.2 SEVERE EPISODE OF RECURRENT MAJOR DEPRESSIVE DISORDER, WITHOUT PSYCHOTIC FEATURES (HCC): Primary | ICD-10-CM

## 2017-07-21 DIAGNOSIS — F41.0 SEVERE ANXIETY WITH PANIC: ICD-10-CM

## 2017-07-21 DIAGNOSIS — G47.00 INSOMNIA, UNSPECIFIED TYPE: ICD-10-CM

## 2017-07-21 RX ORDER — ESCITALOPRAM OXALATE 20 MG/1
20 TABLET ORAL DAILY
Qty: 30 TAB | Refills: 0 | Status: SHIPPED | OUTPATIENT
Start: 2017-07-21 | End: 2017-08-16 | Stop reason: SDUPTHER

## 2017-07-21 RX ORDER — LORAZEPAM 0.5 MG/1
0.5 TABLET ORAL
Qty: 60 TAB | Refills: 0 | Status: SHIPPED | OUTPATIENT
Start: 2017-07-21 | End: 2017-08-20

## 2017-07-21 NOTE — MR AVS SNAPSHOT
Visit Information Date & Time Provider Department Dept. Phone Encounter #  
 7/21/2017  9:00 AM MD Umer Babb [phone] [id number] Follow-up Instructions Return in about 1 month (around 8/21/2017). Your Appointments 7/26/2017  2:30 PM  
COUNSELING with DIONICIO Cash 5 ([facility]) Appt Note:   
 [address] [facility] [address] [place] [address]  
[phone] [address] [place] [address] Upcoming Health Maintenance Date Due Hepatitis C Screening 1960 DTaP/Tdap/Td series (1 - Tdap) 12/17/1983 PAP AKA CERVICAL CYTOLOGY 12/17/1983 BREAST CANCER SCRN MAMMOGRAM 12/17/2012 FOBT Q 1 YEAR AGE 50-75 12/17/2012 INFLUENZA AGE 9 TO ADULT 8/1/2017 Allergies as of 7/21/2017  Review Complete On: 7/21/2017 By: Siva Patterson MD  
 No Known Allergies Current Immunizations  Never Reviewed No immunizations on file. Not reviewed this visit You Were Diagnosed With   
  
 Codes Comments Severe episode of recurrent major depressive disorder, without psychotic features ([facility]    -  Primary ICD-10-CM: F33.2 ICD-9-CM: 296.33 Severe anxiety with panic     ICD-10-CM: F41.0 ICD-9-CM: 300.01 Insomnia, unspecified type     ICD-10-CM: G47.00 ICD-9-CM: 780.52 Vitals BP Pulse Height(growth percentile) Weight(growth percentile) LMP BMI  
 118/66 63 5' 7\" (1.702 m) 142 lb (64.4 kg) 03/20/2017 22.24 kg/m2 OB Status Smoking Status Postmenopausal Never Smoker Vitals History BMI and BSA Data Body Mass Index Body Surface Area  
 22.24 kg/m 2 1.74 m 2 [facility] [address] [phone] Your Updated Medication List  
  
   
This list is accurate as of: 7/21/17  9:15 AM.  Always use your most recent med list.  
  
  
  
  
 escitalopram oxalate 20 mg tablet Commonly known as:  Beasley Saranya Take 1 Tab by mouth daily for 30 days. Indications: ANXIETY WITH DEPRESSION  
  
 levothyroxine 50 mcg tablet Commonly known as:  SYNTHROID Take 50 mcg by mouth Daily (before breakfast). LORazepam 0.5 mg tablet Commonly known as:  ATIVAN Take 1 Tab by mouth two (2) times daily as needed for Anxiety for up to 30 days. Max Daily Amount: 1 mg. Indications: anxiety  
  
 medroxyPROGESTERone 10 mg tablet Commonly known as:  PROVERA TAKE 1 TAB BY MOUTH DAILY FOR THE FIRST 10 DAYS OF THE MONTH. START TAKING IN APRIL. megestrol 40 mg tablet Commonly known as:  MEGACE  
TAKE 1 TABLET BY MOUTH DAILY  
  
 OMEGA 3 PO Take  by mouth daily. VITAMIN B-12 PO Take  by mouth. VITAMIN D3 PO Take  by mouth daily. Prescriptions Printed Refills LORazepam (ATIVAN) 0.5 mg tablet 0 Sig: Take 1 Tab by mouth two (2) times daily as needed for Anxiety for up to 30 days. Max Daily Amount: 1 mg. Indications: anxiety Class: Print Route: Oral  
  
Prescriptions Sent to Pharmacy Refills  
 escitalopram oxalate (LEXAPRO) 20 mg tablet 0 Sig: Take 1 Tab by mouth daily for 30 days. Indications: ANXIETY WITH DEPRESSION Class: Normal  
 Pharmacy: [facility] [id number] [place] [address] [place]  #: [phone] Route: Oral  
  
Follow-up Instructions Return in about 1 month (around 8/21/2017). Introducing [facility] & HEALTH SERVICES! [employer] introduces Deskwanted patient portal. Now you can access parts of your medical record, email your doctor's office, and request medication refills online. 1. In your internet browser, go to [email or web address] 2. Click on the First Time User? Click Here link in the Sign In box. You will see the New Member Sign Up page. 3. Enter your Deskwanted Access Code exactly as it appears below.  You will not need to use this code after youve completed the sign-up process. If you do not sign up before the expiration date, you must request a new code. · TransitScreen Access Code: [email or web address] Expires: 9/24/2017  2:03 PM 
 
4. Enter the last four digits of your Social Security Number (xxxx) and Date of Birth (mm/dd/yyyy) as indicated and click Submit. You will be taken to the next sign-up page. 5. Create a TransitScreen ID. This will be your TransitScreen login ID and cannot be changed, so think of one that is secure and easy to remember. 6. Create a TransitScreen password. You can change your password at any time. 7. Enter your Password Reset Question and Answer. This can be used at a later time if you forget your password. 8. Enter your e-mail address. You will receive e-mail notification when new information is available in [address] 9. Click Sign Up. You can now view and download portions of your medical record. 10. Click the Download Summary menu link to download a portable copy of your medical information. If you have questions, please visit the Frequently Asked Questions section of the TransitScreen website. Remember, TransitScreen is NOT to be used for urgent needs. For medical emergencies, dial 911. Now available from your iPhone and Android! Please provide this summary of care documentation to your next provider. Your primary care clinician is listed as NONE. If you have any questions after today's visit, please call [phone].

## 2017-07-21 NOTE — PROGRESS NOTES
Psychiatric Progress Note    Date: 2017  Account Number:  [de-identified]  Name: Fransico Baez    Length of psychotherapy session: 15 minutes     Total Patient Care Time Spent: 20 minutes : (Coordinated care:  counseling time with patient, individual psychotherapy with patient; discussions with family members and chart review). SUBJECTIVE:   Fransico Baez  is a 49 y.o.  female  patient presents for a therapy/psychopharmacological management appointment. Pt reports \"hangng in there\". Seemed a little calmer, not as tearful or sobbing while talking about her  dog. 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 Pt is attending psychotherapy and finds it very helpful. Patient denies SI/HI/SIB. No evidence of AH/VH or delusions.       Appetite:no change from normal   Sleep: no change    Response to Treatment: Positive   Side Effects: none      Supportive/Cognitive/Reality-Oriented psychotherapy provided in regards to psychosocial stressors:   pre-admission and current problems   Housing issues   Occupational issues   Academic issues   Legal issues   Medical issues   Interpersonal conflicts   Stress of hospitalization  Psychoeducation provided  Treatment plan reviewed with patient-including diagnosis and medications  Worked on issues of denial & effects of substance dependency/use      OBJECTIVE:                 Mental Status exam: WNL except for      Sensorium  oriented to time, place and person   Relations cooperative    Eye Contact    appropriate   Appearance:  age appropriate, casually dressed, tall, WNL   Motor Behavior:  within normal limits   Speech:  normal pitch and normal volume   Thought Process: goal directed and logical   Thought Content free of delusions and free of hallucinations   Suicidal ideations none   Homicidal ideations none   Mood:  Less depressed and anxious   Affect:  mood-congruent   Memory recent  adequate   Memory remote:  adequate   Concentration:  adequate   Abstraction:  abstract   Insight:  good   Reliability good   Judgment:  good       No Known Allergies     Current Outpatient Prescriptions   Medication Sig Dispense Refill    escitalopram oxalate (LEXAPRO) 20 mg tablet Take 1 Tab by mouth daily for 30 days. Indications: ANXIETY WITH DEPRESSION 30 Tab 0    LORazepam (ATIVAN) 0.5 mg tablet Take 1 Tab by mouth two (2) times daily as needed for Anxiety for up to 30 days. Max Daily Amount: 1 mg. Indications: anxiety 60 Tab 0    CYANOCOBALAMIN, VITAMIN B-12, (VITAMIN B-12 PO) Take  by mouth.  medroxyPROGESTERone (PROVERA) 10 mg tablet TAKE 1 TAB BY MOUTH DAILY FOR THE FIRST 10 DAYS OF THE MONTH. START TAKING IN APRIL.  0    megestrol (MEGACE) 40 mg tablet TAKE 1 TABLET BY MOUTH DAILY  1    FLAXSEED OIL (OMEGA 3 PO) Take  by mouth daily.  CHOLECALCIFEROL, VITAMIN D3, (VITAMIN D3 PO) Take  by mouth daily.  levothyroxine (SYNTHROID) 50 mcg tablet Take 50 mcg by mouth Daily (before breakfast). Medication Changes/Adjustments:   Medications Discontinued During This Encounter   Medication Reason    escitalopram oxalate (LEXAPRO) 20 mg tablet Reorder    LORazepam (ATIVAN) 0.5 mg tablet Reorder          ASSESSMENT:  Mohini Middleton  is a 49 y.o.  female patient presented for her f/u appointment, pt seems a little better, more composed though still depressed and preoccupied with her dog's death, able to process her grief in psychotherapy which she finds helpful. Diagnoses:   Axis I: Major Depressive disorder, recurrent, severe, without psychotic symptoms  Severe anxiety with panic attacks  Insomnia   Axis II:  Deferred  Axis III: None reported   Axis IV: moderate   Axis V:  55-65    RECOMMENDATIONS/PLAN:   1. Medications: Medications reviewed, continue with the current meds, continue psychotherapy.    Orders Placed This Encounter    escitalopram oxalate (LEXAPRO) 20 mg tablet    LORazepam (ATIVAN) 0.5 mg tablet      2. Follow-up Disposition:  Return in about 1 month (around 8/21/2017).

## 2017-09-13 ENCOUNTER — OFFICE VISIT (OUTPATIENT)
Dept: BEHAVIORAL/MENTAL HEALTH CLINIC | Age: 57
End: 2017-09-13

## 2017-09-13 VITALS
SYSTOLIC BLOOD PRESSURE: 80 MMHG | HEART RATE: 66 BPM | BODY MASS INDEX: 22.57 KG/M2 | WEIGHT: 143.8 LBS | RESPIRATION RATE: 18 BRPM | OXYGEN SATURATION: 98 % | TEMPERATURE: 97.9 F | HEIGHT: 67 IN | DIASTOLIC BLOOD PRESSURE: 40 MMHG

## 2017-09-13 DIAGNOSIS — F41.0 SEVERE ANXIETY WITH PANIC: ICD-10-CM

## 2017-09-13 DIAGNOSIS — G47.00 INSOMNIA, UNSPECIFIED TYPE: ICD-10-CM

## 2017-09-13 DIAGNOSIS — F32.2 SEVERE MAJOR DEPRESSIVE DISORDER (HCC): Primary | ICD-10-CM

## 2017-09-13 RX ORDER — ESCITALOPRAM OXALATE 20 MG/1
20 TABLET ORAL DAILY
Qty: 30 TAB | Refills: 1 | Status: SHIPPED | OUTPATIENT
Start: 2017-09-13 | End: 2017-10-16 | Stop reason: SDUPTHER

## 2017-09-13 RX ORDER — LORAZEPAM 0.5 MG/1
0.5 TABLET ORAL
Qty: 60 TAB | Refills: 1 | Status: SHIPPED | OUTPATIENT
Start: 2017-09-13

## 2017-09-13 NOTE — PROGRESS NOTES
Chief Complaint   Patient presents with   [address] Problem       Patient is seen at [facility] for her Primary Care. 1. Have you been to the ER, urgent care clinic since your last visit? Hospitalized since your last visit? No    2. Have you seen or consulted any other health care providers outside of the [address] [place] [address] since your last visit? Include any pap smears or colon screening.  No

## 2017-09-13 NOTE — MR AVS SNAPSHOT
Visit Information Date & Time Provider Department Dept. Phone Encounter #  
 9/13/2017  3:30 PM Anabella Mandel MD [id number] [facility] [phone] [id number] Follow-up Instructions Return in about 1 month (around 10/13/2017). Upcoming Health Maintenance Date Due Hepatitis C Screening 1960 DTaP/Tdap/Td series (1 - Tdap) 12/17/1983 PAP AKA CERVICAL CYTOLOGY 12/17/1983 BREAST CANCER SCRN MAMMOGRAM 12/17/2012 FOBT Q 1 YEAR AGE 50-75 12/17/2012 INFLUENZA AGE 9 TO ADULT 8/1/2017 Allergies as of 9/13/2017  Review Complete On: 9/13/2017 By: Anabella Mandel MD  
 No Known Allergies Current Immunizations  Never Reviewed No immunizations on file. Not reviewed this visit You Were Diagnosed With   
  
 Codes Comments Severe major depressive disorder ([facility]    -  Primary ICD-10-CM: N05.3 ICD-9-CM: 296.23 Severe anxiety with panic     ICD-10-CM: F41.0 ICD-9-CM: 300.01 Insomnia, unspecified type     ICD-10-CM: G47.00 ICD-9-CM: 780.52 Vitals BP Pulse Temp Resp Height(growth percentile) Weight(growth percentile) (!) 80/40 66 97.9 °F (36.6 °C) (Oral) 18 5' 7\" (1.702 m) 143 lb 12.8 oz (65.2 kg) LMP SpO2 BMI OB Status Smoking Status 03/20/2017 98% 22.52 kg/m2 Postmenopausal Never Smoker Vitals History BMI and BSA Data Body Mass Index Body Surface Area  
 22.52 kg/m 2 1.76 m 2 [facility]erred [facility] [address] [phone] Your Updated Medication List  
  
   
This list is accurate as of: 9/13/17  3:45 PM.  Always use your most recent med list.  
  
  
  
  
 escitalopram oxalate 20 mg tablet Commonly known as:  Fredick Herring Take 1 Tab by mouth daily. Indications: ANXIETY WITH DEPRESSION  
  
 levothyroxine 50 mcg tablet Commonly known as:  SYNTHROID Take 50 mcg by mouth Daily (before breakfast). LORazepam 0.5 mg tablet Commonly known as:  ATIVAN Take 1 Tab by mouth two (2) times daily as needed for Anxiety. Max Daily Amount: 1 mg. Indications: anxiety  
  
 medroxyPROGESTERone 10 mg tablet Commonly known as:  PROVERA TAKE 1 TAB BY MOUTH DAILY FOR THE FIRST 10 DAYS OF THE MONTH. START TAKING IN APRIL. megestrol 40 mg tablet Commonly known as:  MEGACE  
TAKE 1 TABLET BY MOUTH DAILY  
  
 OMEGA 3 PO Take  by mouth daily. VITAMIN B-12 PO Take  by mouth. VITAMIN D3 PO Take  by mouth daily. Prescriptions Printed Refills LORazepam (ATIVAN) 0.5 mg tablet 1 Sig: Take 1 Tab by mouth two (2) times daily as needed for Anxiety. Max Daily Amount: 1 mg. Indications: anxiety Class: Print Route: Oral  
  
Prescriptions Sent to Pharmacy Refills  
 escitalopram oxalate (LEXAPRO) 20 mg tablet 1 Sig: Take 1 Tab by mouth daily. Indications: ANXIETY WITH DEPRESSION Class: Normal  
 Pharmacy: [facility] [id number] [place] [facility] [address] [facility] [place]  #: [phone] Route: Oral  
  
Follow-up Instructions Return in about 1 month (around 10/13/2017). Introducing [facility] & HEALTH SERVICES! [employer] introduces CoNarrative patient portal. Now you can access parts of your medical record, email your doctor's office, and request medication refills online. 1. In your internet browser, go to [email or web address] 2. Click on the First Time User? Click Here link in the Sign In box. You will see the New Member Sign Up page. 3. Enter your CoNarrative Access Code exactly as it appears below. You will not need to use this code after youve completed the sign-up process. If you do not sign up before the expiration date, you must request a new code. · CoNarrative Access Code: [email or web address] Expires: 9/24/2017  2:03 PM 
 
4.  Enter the last four digits of your Social Security Number (xxxx) and Date of Birth (mm/dd/yyyy) as indicated and click Submit. You will be taken to the next sign-up page. 5. Create a EyeEm ID. This will be your EyeEm login ID and cannot be changed, so think of one that is secure and easy to remember. 6. Create a EyeEm password. You can change your password at any time. 7. Enter your Password Reset Question and Answer. This can be used at a later time if you forget your password. 8. Enter your e-mail address. You will receive e-mail notification when new information is available in [address] 9. Click Sign Up. You can now view and download portions of your medical record. 10. Click the Download Summary menu link to download a portable copy of your medical information. If you have questions, please visit the Frequently Asked Questions section of the EyeEm website. Remember, EyeEm is NOT to be used for urgent needs. For medical emergencies, dial 911. Now available from your iPhone and Android! Please provide this summary of care documentation to your next provider. Your primary care clinician is listed as NONE. If you have any questions after today's visit, please call [phone].

## 2017-09-14 ENCOUNTER — TELEPHONE (OUTPATIENT)
Dept: BEHAVIORAL/MENTAL HEALTH CLINIC | Age: 57
End: 2017-09-14

## 2017-09-14 RX ORDER — ESCITALOPRAM OXALATE 20 MG/1
TABLET ORAL
Qty: 30 TAB | Refills: 0 | OUTPATIENT
Start: 2017-09-14

## 2017-09-14 NOTE — TELEPHONE ENCOUNTER
Was unable to fax pts refill for [facility]ep[facility]. [facility], spoke with Anayeli/Pharmacist.  Pt has been made aware and verbalized understanding that her refill is ready for .

## 2017-09-18 NOTE — PROGRESS NOTES
Psychiatric Progress Note    Date: 9/13/2017  Account Number:  [de-identified]  Name: Gabo Albrecht    Length of psychotherapy session: 15 minutes     Total Patient Care Time Spent: 20 minutes : (Coordinated care:  counseling time with patient, individual psychotherapy with patient; discussions with family members and chart review). SUBJECTIVE:   Gabo Albrecht  is a 49 y.o.  female  patient presents for a therapy/psychopharmacological management appointment. Pt continues to report feeling stressed out and depressed due to her current circumstances. She has left the apartment, and has put her stuff in the storage. 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 Pt is still deciding what to do in regards to her  who went back to [facility] leaving his credit cards and loan for pt to pay. Pt is very upset as he has been very defiant about everything, she is not sure if this marriage will workt or not, she plans to go to [facility] to get the things finalized. Provided support to pt. Of note pt has not been able to attend psychotherapy with Kera as insurance is not paying for her visits. Patient denies SI/HI/SIB. No evidence of AH/VH or delusions.       Appetite:no change from normal   Sleep: no change    Response to Treatment: Positive   Side Effects: none      Supportive/Cognitive/Reality-Oriented psychotherapy provided in regards to psychosocial stressors:   pre-admission and current problems   Housing issues   Occupational issues   Academic issues   Legal issues   Medical issues   Interpersonal conflicts   Stress of hospitalization  Psychoeducation provided  Treatment plan reviewed with patient-including diagnosis and medications  Worked on issues of denial & effects of substance dependency/use      OBJECTIVE:                 Mental Status exam: WNL except for      Sensorium  oriented to time, place and person   Relations cooperative Eye Contact    appropriate   Appearance:  age appropriate, casually dressed, tall, WNL   Motor Behavior:  within normal limits   Speech:  normal pitch and normal volume   Thought Process: goal directed and logical   Thought Content free of delusions and free of hallucinations   Suicidal ideations none   Homicidal ideations none   Mood:  Stressed and depressed    Affect:  mood-congruent   Memory recent  adequate   Memory remote:  adequate   Concentration:  adequate   Abstraction:  abstract   Insight:  good   Reliability good   Judgment:  good       No Known Allergies     Current Outpatient Prescriptions   Medication Sig Dispense Refill    escitalopram oxalate (LEXAPRO) 20 mg tablet Take 1 Tab by mouth daily. Indications: ANXIETY WITH DEPRESSION 30 Tab 1    LORazepam (ATIVAN) 0.5 mg tablet Take 1 Tab by mouth two (2) times daily as needed for Anxiety. Max Daily Amount: 1 mg. Indications: anxiety 60 Tab 1    CYANOCOBALAMIN, VITAMIN B-12, (VITAMIN B-12 PO) Take  by mouth.  FLAXSEED OIL (OMEGA 3 PO) Take  by mouth daily.  CHOLECALCIFEROL, VITAMIN D3, (VITAMIN D3 PO) Take  by mouth daily.  levothyroxine (SYNTHROID) 50 mcg tablet Take 50 mcg by mouth Daily (before breakfast).  medroxyPROGESTERone (PROVERA) 10 mg tablet TAKE 1 TAB BY MOUTH DAILY FOR THE FIRST 10 DAYS OF THE MONTH. START TAKING IN APRIL.  0    megestrol (MEGACE) 40 mg tablet TAKE 1 TABLET BY MOUTH DAILY  1        Medication Changes/Adjustments:   Medications Discontinued During This Encounter   Medication Reason    escitalopram oxalate (LEXAPRO) 20 mg tablet Reorder          ASSESSMENT:  Joe Mcpherson  is a 49 y.o.  female patient presented for her f/u appointment, pt is tolerating Lexapro well, though stressed and depressed but able to focus on her future and do problem solving.      Diagnoses:   Axis I: Major Depressive disorder, recurrent, severe, without psychotic symptoms  Severe anxiety with panic attacks  Insomnia   Axis II: Deferred  Axis III: None reported   Axis IV: moderate   Axis V:  55-65    RECOMMENDATIONS/PLAN:   1. Medications: Medications reviewed, continue with the current meds. Orders Placed This Encounter    escitalopram oxalate (LEXAPRO) 20 mg tablet    LORazepam (ATIVAN) 0.5 mg tablet      2. Follow-up Disposition:  Return in about 1 month (around 10/13/2017).

## 2017-10-16 ENCOUNTER — OFFICE VISIT (OUTPATIENT)
Dept: BEHAVIORAL/MENTAL HEALTH CLINIC | Age: 57
End: 2017-10-16

## 2017-10-16 VITALS
SYSTOLIC BLOOD PRESSURE: 113 MMHG | DIASTOLIC BLOOD PRESSURE: 58 MMHG | BODY MASS INDEX: 22.6 KG/M2 | HEART RATE: 70 BPM | WEIGHT: 144 LBS | HEIGHT: 67 IN

## 2017-10-16 DIAGNOSIS — F32.2 SEVERE MAJOR DEPRESSIVE DISORDER (HCC): Primary | ICD-10-CM

## 2017-10-16 DIAGNOSIS — F41.0 SEVERE ANXIETY WITH PANIC: ICD-10-CM

## 2017-10-16 DIAGNOSIS — G47.00 INSOMNIA, UNSPECIFIED TYPE: ICD-10-CM

## 2017-10-16 RX ORDER — ESCITALOPRAM OXALATE 20 MG/1
20 TABLET ORAL DAILY
Qty: 90 TAB | Refills: 1 | Status: SHIPPED | OUTPATIENT
Start: 2017-10-16 | End: 2018-04-03 | Stop reason: SDUPTHER

## 2017-10-16 NOTE — MR AVS SNAPSHOT
Visit Information Date & Time Provider Department Dept. Phone Encounter #  
 10/16/2017  9:15 AM Anna Solitario MD [address] [phone] [id number] Follow-up Instructions Return in about 3 months (around 1/16/2018). Upcoming Health Maintenance Date Due Hepatitis C Screening 1960 DTaP/Tdap/Td series (1 - Tdap) 12/17/1983 PAP AKA CERVICAL CYTOLOGY 12/17/1983 BREAST CANCER SCRN MAMMOGRAM 12/17/2012 FOBT Q 1 YEAR AGE 50-75 12/17/2012 INFLUENZA AGE 9 TO ADULT 8/1/2017 Allergies as of 10/16/2017  Review Complete On: 10/16/2017 By: Anna Solitario MD  
 No Known Allergies Current Immunizations  Never Reviewed No immunizations on file. Not reviewed this visit You Were Diagnosed With   
  
 Codes Comments Severe [facility]    -  Primary ICD-10-CM: W31.5 ICD-9-CM: 296.23 Severe anxiety with panic     ICD-10-CM: F41.0 ICD-9-CM: 300.01 Insomnia, unspecified type     ICD-10-CM: G47.00 ICD-9-CM: 780.52 Vitals BP Pulse Height(growth percentile) Weight(growth percentile) LMP BMI  
 113/58 70 5' 7\" (1.702 m) 144 lb (65.3 kg) 03/20/2017 22.55 kg/m2 OB Status Smoking Status Postmenopausal Never Smoker BMI and BSA Data Body Mass Index Body Surface Area  
 22.55 kg/m 2 1.76 m 2 Preferred Pharmacy Pharmacy Name Phone [facility] [address] [phone] Your Updated Medication List  
  
   
This list is accurate as of: 10/16/17  9:40 AM.  Always use your most recent med list.  
  
  
  
  
 escitalopram oxalate 20 mg tablet Commonly known as:  Nader Heir Take 1 Tab by mouth daily. Indications: ANXIETY WITH DEPRESSION  
  
 levothyroxine 50 mcg tablet Commonly known as:  SYNTHROID Take 50 mcg by mouth Daily (before breakfast). LORazepam 0.5 mg tablet Commonly known as:  ATIVAN  
 Take 1 Tab by mouth two (2) times daily as needed for Anxiety. Max Daily Amount: 1 mg. Indications: anxiety  
  
 medroxyPROGESTERone 10 mg tablet Commonly known as:  PROVERA TAKE 1 TAB BY MOUTH DAILY FOR THE FIRST 10 DAYS OF THE MONTH. START TAKING IN APRIL. megestrol 40 mg tablet Commonly known as:  MEGACE  
TAKE 1 TABLET BY MOUTH DAILY  
  
 OMEGA 3 PO Take  by mouth daily. VITAMIN B-12 PO Take  by mouth. VITAMIN D3 PO Take  by mouth daily. Prescriptions Sent to Pharmacy Refills  
 escitalopram oxalate (LEXAPRO) 20 mg tablet 1 Sig: Take 1 Tab by mouth daily. Indications: ANXIETY WITH DEPRESSION Class: Normal  
 [id number] [place] [facility], [address] [address]  #: [phone] Route: Oral  
  
Follow-up Instructions Return in about 3 months (around 1/16/2018). Introducing [facility]! [facility] introduces [email or web address] patient portal. Now you can access parts of your medical record, email your doctor's office, and request medication refills online. 1. In your internet browser, go to [email or web address] 2. Click on the First Time User? Click Here link in the Sign In box. You will see the New Member Sign Up page. 3. Enter your [email or web address] Access Code exactly as it appears below. You will not need to use this code after youve completed the sign-up process. If you do not sign up before the expiration date, you must request a new code. · [email or web address] Access Code: [email or web address] Expires: 1/14/2018  9:36 AM 
 
4. Enter the last four digits of your Social Security Number (xxxx) and Date of Birth (mm/dd/yyyy) as indicated and click Submit. You will be taken to the next sign-up page. 5. Create a [email or web address] ID. This will be your [email or web address] login ID and cannot be changed, so think of one that is secure and easy to remember. 6. Create a [employer] password. You can change your password at any time. 7. Enter your Password Reset Question and Answer. This can be used at a later time if you forget your password. 8. Enter your e-mail address. You will receive e-mail notification when new information is available in [address] 9. Click Sign Up. You can now view and download portions of your medical record. 10. Click the Download Summary menu link to download a portable copy of your medical information. If you have questions, please visit the Frequently Asked Questions section of the [employer] website. Remember, [employer] is NOT to be used for urgent needs. For medical emergencies, dial 911. Now available from your iPhone and Android! Please provide this summary of care documentation to your next provider. Your primary care clinician is listed as NONE. If you have any questions after today's visit, please call [phone].

## 2017-10-16 NOTE — PROGRESS NOTES
Psychiatric Progress Note    Date: 10/16/2017  Account Number:  [de-identified]  Name: Xiao Griffith    Length of psychotherapy session: 15 minutes     Total Patient Care Time Spent: 20 minutes : (Coordinated care:  counseling time with patient, individual psychotherapy with patient; discussions with family members and chart review). SUBJECTIVE:   Xiao Griffith  is a 49 y.o.  female  patient presents for a therapy/psychopharmacological management appointment. Pt reports doing better, is going to [facility] in a week to visit her . She did go to [place] last week to visit her friends and to look around for the possible move to [place] after her return from [facility]. She is taking meds as prescribed and responding to the medicine well. Patient denies SI/HI/SIB. No evidence of AH/VH or delusions.       Appetite:no change from normal   Sleep: no change    Response to Treatment: Positive   Side Effects: none      Supportive/Cognitive/Reality-Oriented psychotherapy provided in regards to psychosocial stressors:   pre-admission and current problems   Housing issues   Occupational issues   Academic issues   Legal issues   Medical issues   Interpersonal conflicts   Stress of hospitalization  Psychoeducation provided  Treatment plan reviewed with patient-including diagnosis and medications  Worked on issues of denial & effects of substance dependency/use      OBJECTIVE:                 Mental Status exam: WNL except for      Sensorium  oriented to time, place and person   Relations cooperative    Eye Contact    appropriate   Appearance:  age appropriate, casually dressed, tall, WNL   Motor Behavior:  within normal limits   Speech:  normal pitch and normal volume   Thought Process: goal directed and logical   Thought Content free of delusions and free of hallucinations   Suicidal ideations none   Homicidal ideations none   Mood:  improved   Affect:  mood-congruent   Memory recent  adequate   Memory remote:  adequate Concentration:  adequate   Abstraction:  abstract   Insight:  good   Reliability good   Judgment:  good       No Known Allergies     Current Outpatient Prescriptions   Medication Sig Dispense Refill    escitalopram oxalate (LEXAPRO) 20 mg tablet Take 1 Tab by mouth daily. Indications: ANXIETY WITH DEPRESSION 90 Tab 1    CYANOCOBALAMIN, VITAMIN B-12, (VITAMIN B-12 PO) Take  by mouth.  medroxyPROGESTERone (PROVERA) 10 mg tablet TAKE 1 TAB BY MOUTH DAILY FOR THE FIRST 10 DAYS OF THE MONTH. START TAKING IN APRIL.  0    megestrol (MEGACE) 40 mg tablet TAKE 1 TABLET BY MOUTH DAILY  1    FLAXSEED OIL (OMEGA 3 PO) Take  by mouth daily.  CHOLECALCIFEROL, VITAMIN D3, (VITAMIN D3 PO) Take  by mouth daily.  levothyroxine (SYNTHROID) 50 mcg tablet Take 50 mcg by mouth Daily (before breakfast).  LORazepam (ATIVAN) 0.5 mg tablet Take 1 Tab by mouth two (2) times daily as needed for Anxiety. Max Daily Amount: 1 mg. Indications: anxiety 60 Tab 1        Medication Changes/Adjustments:   Medications Discontinued During This Encounter   Medication Reason    escitalopram oxalate (LEXAPRO) 20 mg tablet Reorder          ASSESSMENT:  Mickey Perez  is a 49 y.o.  female patient presented for her f/u appointment, pt is doing better on her current meds, going to [facility] for 3 months, a 90 days supply of Lexapro provided to pt. Diagnoses:   Axis I: Major Depressive disorder, recurrent, severe, without psychotic symptoms  Severe anxiety with panic attacks  Insomnia   Axis II:  Deferred  Axis III: None reported   Axis IV: moderate   Axis V:  60-65    RECOMMENDATIONS/PLAN:   1. Medications: Medications reviewed, continue with the current meds. Orders Placed This Encounter    escitalopram oxalate (LEXAPRO) 20 mg tablet      2. Follow-up Disposition:  Return in about 3 months (around 1/16/2018).

## 2018-04-03 RX ORDER — ESCITALOPRAM OXALATE 20 MG/1
TABLET ORAL
Qty: 90 TAB | Refills: 1 | Status: SHIPPED | OUTPATIENT
Start: 2018-04-03